# Patient Record
Sex: MALE | Race: BLACK OR AFRICAN AMERICAN | Employment: OTHER | URBAN - METROPOLITAN AREA
[De-identification: names, ages, dates, MRNs, and addresses within clinical notes are randomized per-mention and may not be internally consistent; named-entity substitution may affect disease eponyms.]

---

## 2023-02-10 ENCOUNTER — OFFICE VISIT (OUTPATIENT)
Dept: ORTHOPEDIC SURGERY | Age: 48
End: 2023-02-10
Payer: MEDICARE

## 2023-02-10 VITALS — TEMPERATURE: 97.5 F | WEIGHT: 230 LBS | OXYGEN SATURATION: 97 % | HEART RATE: 70 BPM | BODY MASS INDEX: 33 KG/M2

## 2023-02-10 DIAGNOSIS — Z98.1 STATUS POST LUMBAR SPINAL FUSION: ICD-10-CM

## 2023-02-10 DIAGNOSIS — M25.561 RIGHT KNEE PAIN, UNSPECIFIED CHRONICITY: Primary | ICD-10-CM

## 2023-02-10 DIAGNOSIS — M17.31 POST-TRAUMATIC OSTEOARTHRITIS OF RIGHT KNEE: ICD-10-CM

## 2023-02-10 DIAGNOSIS — M54.9 CHRONIC BACK PAIN GREATER THAN 3 MONTHS DURATION: ICD-10-CM

## 2023-02-10 DIAGNOSIS — M25.661 KNEE STIFFNESS, RIGHT: ICD-10-CM

## 2023-02-10 DIAGNOSIS — G89.29 CHRONIC BACK PAIN GREATER THAN 3 MONTHS DURATION: ICD-10-CM

## 2023-02-10 DIAGNOSIS — M17.11 PRIMARY OSTEOARTHRITIS OF RIGHT KNEE: ICD-10-CM

## 2023-02-10 DIAGNOSIS — M25.461 EFFUSION OF RIGHT KNEE: ICD-10-CM

## 2023-02-10 RX ORDER — BETAMETHASONE SODIUM PHOSPHATE AND BETAMETHASONE ACETATE 3; 3 MG/ML; MG/ML
3 INJECTION, SUSPENSION INTRA-ARTICULAR; INTRALESIONAL; INTRAMUSCULAR; SOFT TISSUE ONCE
Status: COMPLETED | OUTPATIENT
Start: 2023-02-10 | End: 2023-02-10

## 2023-02-10 RX ADMIN — BETAMETHASONE SODIUM PHOSPHATE AND BETAMETHASONE ACETATE 3 MG: 3; 3 INJECTION, SUSPENSION INTRA-ARTICULAR; INTRALESIONAL; INTRAMUSCULAR; SOFT TISSUE at 09:09

## 2023-02-10 NOTE — PROGRESS NOTES
Patient: Matt Martinez                MRN: [id number]       SSN: [id number]  YOB: 1975        AGE: 49 y.o. SEX: male      PCP: Zeyad Kennedy MD  02/10/23    Chief Complaint   Patient presents with    Knee Pain     right       HISTORY:  Matt Martinez is a 49 y.o. male who is seen for an increase in right knee swelling and pain. Mr. Suhas Thomson sustained a medial meniscus tear in 2013 proven by MRI scan. He underwent right knee arthroscopy and partial medial meniscectomy. He responded to that procedure. He has been experiencing some increased slipping popping and stiffness symptoms over the past month. Mr. Suhas Thomson has a history of chronic low back pain. He has undergone a total of 7 lumbar surgeries--1 by Dr. Cleotha Heimlich, 3 by Dr. Grant Cochran, and 3 by Dr. Betzy Gregory. Occupation, etc: Mr. Suhas Thomson works as a  for the [employer]. He receives Social Security disability benefits for chronic low back pain. He has been in pain management in the past with Dr. Isaac Curran. He lives in [place], [place] with his wife and 15year-old son. He also has a 80-year-old son living in nearby. He has 2 daughters in [place]. He served 1 year in the [employer]. He used to play football in high school. Last 3 Recorded Weights in this Encounter    02/10/23 0835   Weight: 230 lb (104.3 kg)     Body mass index is 33 kg/m². Patient Active Problem List   Diagnosis Code    LBP (low back pain) M54.50    Chronic pain syndrome G89.4    S/P lumbar fusion Z98.1    Radiculitis M54.10    Depression F32. A    Postlaminectomy syndrome, lumbar region M96.1    Pain in joint, lower leg M25.569    Right wrist pain M25.531    Right knee pain M25.561    Encounter for long-term (current) use of other medications Z79.899    Insomnia secondary to chronic pain G89.29, G47.01       REVIEW OF SYSTEMS: All Below are Negative except: See HPI     Constitutional: negative for fever, chills, and weight loss. Cardiovascular: negative for chest pain, claudication, leg swelling, SOB, ALEXANDER   Gastrointestinal: Negative for pain, N/V/C/D, Blood in stool or urine, dysuria,  hematuria, incontinence, pelvic pain. Musculoskeletal: See HPI   Neurological: Negative for dizziness and weakness. Negative for headaches, Visual changes, confusion, seizures   Phychiatric/Behavioral: Negative for depression, memory loss, substance  abuse. Extremities: Negative for hair changes, rash, or skin lesion changes. Hematologic: Negative for bleeding problems, bruising, pallor or swollen lymph  nodes   Peripheral Vascular: No calf pain, no circulation deficits. Social History     Socioeconomic History    Marital status:      Spouse name: Not on file    Number of children: Not on file    Years of education: Not on file    Highest education level: Not on file   Occupational History    Not on file   Tobacco Use    Smoking status: Every Day     Packs/day: 0.50     Types: Cigarettes    Smokeless tobacco: Not on file    Tobacco comments:     e-cigs   Substance and Sexual Activity    Alcohol use: No    Drug use: No    Sexual activity: Not on file   Other Topics Concern    Not on file   Social History Narrative    Not on file     Social Determinants of Health     Financial Resource Strain: Not on file   Food Insecurity: Not on file   Transportation Needs: Not on file   Physical Activity: Not on file   Stress: Not on file   Social Connections: Not on file   Intimate Partner Violence: Not on file   Housing Stability: Not on file        Allergies   Allergen Reactions    Fruit C [Ascorbic Acid] Rash     strawberries        Current Outpatient Medications   Medication Sig    morphine CR (MS CONTIN) 30 mg CR tablet Take 1 tablet by mouth every 12 hours for two weeks. Then take 1 tablet a day for the next to weeks and stop.   Indications: CHRONIC PAIN, NEUROPATHIC PAIN, SEVERE PAIN    morphine CR (MS CONTIN) 30 mg CR tablet Take 1 Tab by mouth three (3) times daily for 30 days. Indications: CHRONIC PAIN, NEUROPATHIC PAIN, SEVERE PAIN    oxyCODONE IR (OXY-IR) 15 mg immediate release tablet Take 1 Tab by mouth four (4) times daily as needed for Pain for up to 30 days. Indications: NEUROPATHIC PAIN, PAIN    oxyCODONE IR (OXY-IR) 15 mg immediate release tablet Take 1 Tab by mouth four (4) times daily as needed for Pain for up to 30 days. Indications: NEUROPATHIC PAIN, PAIN    zaleplon (SONATA) 10 mg capsule 1 po qhs prn sleep  May take 2nd dose in 3-4 hours for early awakening  Indications: SLEEP MAINTENANCE INSOMNIA-4 HOURS SLEEP TIME REMAINING, SLEEP-ONSET INSOMNIA    zolpidem (AMBIEN) 10 mg tablet Take 1 tablet by mouth nightly as needed for Sleep for up to 30 days. Indications: SLEEP-ONSET INSOMNIA    gabapentin (NEURONTIN) 300 mg capsule Take 1 capsule by mouth three (3) times daily for 30 days. Indications: NEUROPATHIC PAIN    OTHER DDS LUMBAR TRACTION BELT    Use as directed    hydromorphone (DILAUDID) 4 mg tablet Take 1 tablet by mouth four (4) times daily as needed for Pain for up to 30 days. FILL ON OR AFTER:  9/20/14  Indications: PAIN    hydromorphone (DILAUDID) 4 mg tablet Take 1 tablet by mouth four (4) times daily as needed for Pain for up to 30 days. Indications: PAIN    docusate sodium (COLACE) 100 mg capsule Take 100 mg by mouth two (2) times a day. ondansetron (ZOFRAN ODT) 8 mg disintegrating tablet Take 8 mg by mouth every eight (8) hours as needed for Nausea. flurazepam (DALMANE) 30 mg capsule Take 30 mg by mouth nightly as needed for Sleep.    morphine CR (MS CONTIN) 30 mg CR tablet Take 1 Tab by mouth every twelve (12) hours for 30 days. For chronic pain. Fill on or after 2/27/14  Indications: CHRONIC PAIN, NEUROPATHIC PAIN, SEVERE PAIN    oxyCODONE-acetaminophen (PERCOCET 10)  mg per tablet Take 1 Tab by mouth every six (6) hours as needed for Pain for 30 days. For increased pain.  Fill on or after 01/22/14  Indications: PAIN    morphine CR (MS CONTIN) 15 mg CR tablet Take 1 Tab by mouth every eight (8) hours for 30 days. For chronic pain    oxyCODONE-acetaminophen (PERCOCET 7.5) 7.5-325 mg per tablet Take 1 Tab by mouth three (3) times daily as needed for Pain for 30 days. niacin (NIASPAN) 1,000 mg Tb24 tab Take 2,000 mg by mouth nightly. oxyCODONE-acetaminophen (PERCOCET)  mg per tablet Take 1 Tab by mouth every six (6) hours as needed. temazepam (RESTORIL) 30 mg capsule Take  by mouth nightly as needed. diazepam (VALIUM) 10 mg tablet Take 10 mg by mouth every eight (8) hours as needed. aspirin 81 mg tablet Take 81 mg by mouth daily. sildenafil citrate (VIAGRA) 100 mg tablet Take 100 mg by mouth as needed. omega-3 fatty acids-vitamin e (FISH OIL) 1,000 mg cap Take 1 Cap by mouth daily. polyethylene glycol (MIRALAX) 17 gram packet Take 17 g by mouth daily. cetaphil (CETAPHIL) topical cream Apply  to affected area as needed. morphine CR (MS CONTIN) 15 mg CR tablet Take 1 Tab by mouth every eight (8) hours for 30 days. For chronic pain    oxyCODONE-acetaminophen (PERCOCET 10)  mg per tablet Take 1 Tab by mouth three (3) times daily as needed for Pain for 30 days.      Current Facility-Administered Medications   Medication Dose Route Frequency    betamethasone (CELESTONE) injection 3 mg  3 mg Intra artICUlar ONCE        PHYSICAL EXAMINATION:  Visit Vitals  Pulse 70   Temp 97.5 °F (36.4 °C) (Temporal)   Wt 230 lb (104.3 kg)   SpO2 97%   BMI 33.00 kg/m²        ORTHO EXAMINATION:    Examination Right knee Left knee   Skin Intact Intact   Range of motion 100-0 120-0   Effusion - -   Medial joint line tenderness + +   Lateral joint line tenderness - -   Popliteal tenderness - -   Osteophytes palpable + -   Jovans - -   Patella crepitus - -   Anterior drawer - -   Lateral laxity - -   Medial laxity - -   Varus deformity - -   Valgus deformity - -   Pretibial edema - - Calf tenderness - -     PROCEDURE:  Right knee aspiration and injection. Chart reviewed for the following:   Emilie Sandoval MD, have reviewed the History, Physical and updated the Allergic reactions for [address] performed immediately prior to start of procedure:  Emilie Sandoval MD, have performed the following reviews on Gaetano Ash prior to the start of the procedure:            * Patient was identified by name and date of birth   * Agreement on procedure being performed was verified  * Risks and Benefits explained to the patient  * Procedure site verified and marked as necessary  * Patient was positioned for comfort  * Consent was obtained  Time: 9:13 AM  Date of procedure: 2/10/2023    Procedure performed by:  Dr. Ruma Gavirai    Mr. Lopez tolerated the procedure well with no complications. RADIOGRAPHS: Right knee 3 views. Mild condylar flattening and squaring. No fractures. Effusion present. IMPRESSION:      ICD-10-CM ICD-9-CM    1. Right knee pain, unspecified chronicity  M25.561 719.46 AMB POC X-RAY KNEE 3 VIEW      2. Primary osteoarthritis of right knee  M17.11 715.16 DRAIN/INJECT LARGE JOINT/BURSA      betamethasone (CELESTONE) injection 3 mg      REFERRAL TO ORTHO INJECTION      3. Post-traumatic osteoarthritis of right knee  M17.31 715.26 DRAIN/INJECT LARGE JOINT/BURSA      betamethasone (CELESTONE) injection 3 mg      REFERRAL TO ORTHO INJECTION      4. Knee stiffness, right  M25.661 719.56       5. Status post lumbar spinal fusion  Z98.1 V45.4       6. Chronic back pain greater than 3 months duration  M54.9 724.5     G89.29 338.29       7. Effusion of right knee  M25.461 719.06           PLAN:  Right knee aspirated 70 cc clear yellow fluid & injected 4 cc 0.25% Marcaine and 0.5 cc Celestone. I will see him back in about one month. Consider viscosupplementation if pain continues.     Al London MD

## 2023-02-10 NOTE — LETTER
NOTIFICATION RETURN TO WORK / SCHOOL    2/10/2023 9:13 AM    Mr. Gabe Cortez  [address] [address]      To Whom It May Concern:    Gabe Cortez is currently under the care of [address] [place] [address]. He will return to work 02/10/23. If there are questions or concerns please have the patient contact our office.         Sincerely,      Jessica Rushing MD

## 2023-08-03 ENCOUNTER — OFFICE VISIT (OUTPATIENT)
Age: 48
End: 2023-08-03
Payer: MEDICARE

## 2023-08-03 VITALS — TEMPERATURE: 98.7 F

## 2023-08-03 DIAGNOSIS — M17.11 UNILATERAL PRIMARY OSTEOARTHRITIS, RIGHT KNEE: Primary | ICD-10-CM

## 2023-08-03 DIAGNOSIS — G89.29 CHRONIC PAIN OF RIGHT KNEE: ICD-10-CM

## 2023-08-03 DIAGNOSIS — M25.561 CHRONIC PAIN OF RIGHT KNEE: ICD-10-CM

## 2023-08-03 PROCEDURE — 20610 DRAIN/INJ JOINT/BURSA W/O US: CPT | Performed by: SPECIALIST

## 2023-08-03 RX ORDER — HYALURONATE SODIUM 10 MG/ML
20 SYRINGE (ML) INTRAARTICULAR ONCE
Status: COMPLETED | OUTPATIENT
Start: 2023-08-03 | End: 2023-08-03

## 2023-08-03 RX ADMIN — Medication 20 MG: at 16:29

## 2023-08-03 NOTE — PROGRESS NOTES
Patient: Peter Villagran                MRN: [id number]       SSN: [id number]  YOB: 1975        AGE: 51 y.o. SEX: male      PCP: Dali Kramer MD  08/03/23    Chief Complaint   Patient presents with    Injections     Right knee Euflexxa #1     HISTORY:  Peter Villagran is a 51 y.o. male who is seen for  right knee swelling and pain. He presents today for his first injection in the Euflexxa visco supplementation series. Mr. James Perry sustained a medial meniscus tear in 2013 proven by MRI scan. He underwent  right knee arthroscopy and partial medial meniscectomy. He responded to that procedure. He has been experiencing some increased slipping popping and stiffness symptoms over the past month. Mr. James Perry has a history of chronic low back pain. He has undergone a total of 7 lumbar surgeries--1 by Dr. Tu Haider, 3 by Dr. Erika Holman, and 3 by Dr. Shaneka Uribe. Occupation, etc: Mr. James Perry works as a  for [employer]. He previously worked as a  for the [employer]. He receives Social Security disability benefits for chronic low back pain. He was previously in pain management in the past with Dr. Yo Molina. He lives in [address] with his wife and 15year-old son. He also has a 27-year-old son living in nearby. He has 2 daughters in [place]. He served 1 year in the [employer]. He used to play football in high school. Wt Readings from Last 3 Encounters:   02/10/23 230 lb (104.3 kg)      There is no height or weight on file to calculate BMI.     Patient Active Problem List   Diagnosis    Pain in joint, lower leg    Right knee pain    S/P lumbar fusion    Radiculitis    Chronic pain syndrome    Depression    Encounter for long-term (current) use of other medications    Postlaminectomy syndrome, lumbar region    Right wrist pain    Insomnia secondary to chronic pain         Not on File     No current

## 2023-08-10 ENCOUNTER — OFFICE VISIT (OUTPATIENT)
Age: 48
End: 2023-08-10
Payer: MEDICARE

## 2023-08-10 VITALS — WEIGHT: 230 LBS | HEIGHT: 71 IN | BODY MASS INDEX: 32.2 KG/M2

## 2023-08-10 DIAGNOSIS — M17.11 UNILATERAL PRIMARY OSTEOARTHRITIS, RIGHT KNEE: Primary | ICD-10-CM

## 2023-08-10 PROBLEM — J30.2 OTHER SEASONAL ALLERGIC RHINITIS: Status: ACTIVE | Noted: 2023-08-10

## 2023-08-10 PROBLEM — R06.83 SNORING: Status: ACTIVE | Noted: 2023-08-10

## 2023-08-10 PROBLEM — D57.3 SICKLE CELL TRAIT (HCC): Status: ACTIVE | Noted: 2023-08-10

## 2023-08-10 PROBLEM — E78.1: Status: ACTIVE | Noted: 2023-08-10

## 2023-08-10 PROBLEM — R03.0 ELEVATED BLOOD-PRESSURE READING, WITHOUT DIAGNOSIS OF HYPERTENSION: Status: ACTIVE | Noted: 2023-08-10

## 2023-08-10 PROBLEM — F43.29 ADJUSTMENT DISORDER WITH OTHER SYMPTOMS: Status: ACTIVE | Noted: 2023-08-10

## 2023-08-10 PROBLEM — N39.41 URGE INCONTINENCE OF URINE: Status: ACTIVE | Noted: 2023-08-10

## 2023-08-10 PROBLEM — K57.30 DIVERTICULAR DISEASE OF COLON: Status: ACTIVE | Noted: 2023-08-10

## 2023-08-10 PROBLEM — I10 ESSENTIAL (PRIMARY) HYPERTENSION: Status: ACTIVE | Noted: 2023-08-10

## 2023-08-10 PROBLEM — S83.209A TEAR OF MENISCUS OF KNEE: Status: ACTIVE | Noted: 2023-08-10

## 2023-08-10 PROBLEM — K21.9 GERD (GASTROESOPHAGEAL REFLUX DISEASE): Status: ACTIVE | Noted: 2023-08-10

## 2023-08-10 PROBLEM — N52.1 MALE ERECTILE DISORDER DUE TO PHYSICAL CONDITION: Status: ACTIVE | Noted: 2023-08-10

## 2023-08-10 PROBLEM — R09.82 POSTNASAL DRIP: Status: ACTIVE | Noted: 2023-08-10

## 2023-08-10 PROBLEM — M79.643 PAIN OF HAND: Status: ACTIVE | Noted: 2023-08-10

## 2023-08-10 PROBLEM — R39.9 SYMPTOMS INVOLVING URINARY SYSTEM: Status: ACTIVE | Noted: 2023-08-10

## 2023-08-10 PROBLEM — F17.210 CIGARETTE SMOKER: Status: ACTIVE | Noted: 2023-08-10

## 2023-08-10 PROBLEM — F17.200 NICOTINE DEPENDENCE: Status: ACTIVE | Noted: 2023-08-10

## 2023-08-10 PROBLEM — K64.8 INTERNAL HEMORRHOIDS: Status: ACTIVE | Noted: 2023-08-10

## 2023-08-10 PROBLEM — R94.31 ABNORMAL ELECTROCARDIOGRAM: Status: ACTIVE | Noted: 2023-08-10

## 2023-08-10 PROCEDURE — 20610 DRAIN/INJ JOINT/BURSA W/O US: CPT | Performed by: SPECIALIST

## 2023-08-10 RX ORDER — BUPROPION HYDROCHLORIDE 100 MG/1
TABLET, EXTENDED RELEASE ORAL
COMMUNITY
Start: 2023-02-13 | End: 2024-03-10

## 2023-08-10 RX ORDER — SILDENAFIL 100 MG/1
TABLET, FILM COATED ORAL
COMMUNITY
Start: 2023-03-08 | End: 2024-03-06

## 2023-08-10 RX ORDER — ESCITALOPRAM OXALATE 20 MG/1
TABLET ORAL
COMMUNITY
Start: 2023-05-26

## 2023-08-10 RX ORDER — CETIRIZINE HYDROCHLORIDE 10 MG/1
TABLET ORAL
COMMUNITY
Start: 2023-06-16 | End: 2024-06-13

## 2023-08-10 RX ORDER — HYALURONATE SODIUM 10 MG/ML
20 SYRINGE (ML) INTRAARTICULAR ONCE
Status: COMPLETED | OUTPATIENT
Start: 2023-08-10 | End: 2023-08-10

## 2023-08-10 RX ORDER — HYDROCHLOROTHIAZIDE 25 MG/1
TABLET ORAL
COMMUNITY
Start: 2023-06-01 | End: 2024-05-25

## 2023-08-10 RX ORDER — GABAPENTIN 600 MG/1
TABLET ORAL
COMMUNITY
Start: 2021-03-26 | End: 2024-06-13

## 2023-08-10 RX ORDER — TRAMADOL HYDROCHLORIDE 50 MG/1
TABLET ORAL
COMMUNITY
Start: 2023-03-15 | End: 2023-12-11

## 2023-08-10 RX ADMIN — Medication 20 MG: at 16:22

## 2023-08-17 ENCOUNTER — OFFICE VISIT (OUTPATIENT)
Age: 48
End: 2023-08-17

## 2023-08-17 VITALS — TEMPERATURE: 97.8 F | WEIGHT: 232 LBS | HEIGHT: 71 IN | BODY MASS INDEX: 32.48 KG/M2

## 2023-08-17 DIAGNOSIS — M17.11 UNILATERAL PRIMARY OSTEOARTHRITIS, RIGHT KNEE: Primary | ICD-10-CM

## 2023-08-17 RX ORDER — HYALURONATE SODIUM 10 MG/ML
20 SYRINGE (ML) INTRAARTICULAR ONCE
Status: COMPLETED | OUTPATIENT
Start: 2023-08-17 | End: 2023-08-17

## 2023-08-17 RX ADMIN — Medication 20 MG: at 16:11
